# Patient Record
Sex: MALE | Race: WHITE
[De-identification: names, ages, dates, MRNs, and addresses within clinical notes are randomized per-mention and may not be internally consistent; named-entity substitution may affect disease eponyms.]

---

## 2018-01-02 ENCOUNTER — HOSPITAL ENCOUNTER (EMERGENCY)
Dept: HOSPITAL 41 - JD.ED | Age: 18
Discharge: HOME | End: 2018-01-02
Payer: COMMERCIAL

## 2018-01-02 DIAGNOSIS — S09.90XA: ICD-10-CM
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
## 2018-01-02 DIAGNOSIS — S61.212A: ICD-10-CM

## 2018-01-02 DIAGNOSIS — S01.81XA: Primary | ICD-10-CM
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
